# Patient Record
Sex: FEMALE | Race: AMERICAN INDIAN OR ALASKA NATIVE
[De-identification: names, ages, dates, MRNs, and addresses within clinical notes are randomized per-mention and may not be internally consistent; named-entity substitution may affect disease eponyms.]

---

## 2019-06-19 ENCOUNTER — HOSPITAL ENCOUNTER (EMERGENCY)
Dept: HOSPITAL 5 - ED | Age: 34
Discharge: HOME | End: 2019-06-19
Payer: SELF-PAY

## 2019-06-19 VITALS — SYSTOLIC BLOOD PRESSURE: 123 MMHG | DIASTOLIC BLOOD PRESSURE: 78 MMHG
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
## 2019-06-19 DIAGNOSIS — S63.502A: Primary | ICD-10-CM
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
## 2019-06-19 NOTE — XRAY REPORT
PROCEDURE: XR WRIST 3+V LT 

 

TECHNIQUE:  3 views of the left wrist were submitted. 

 

HISTORY: left wrist pain/injury 

 

COMPARISONS: None  

 

FINDINGS: 

 

There is no evidence of acute fracture or soft tissue injury. There is no evidence of arthritic meza
es. 

 

IMPRESSION:  

 

No acute findings. If localized wrist pain persists, repeat imaging is recommended in 7-10 days to ev
aluate for occult fracture.. 

 

This document is electronically signed by Salazar Yu MD., June 19 2019 03:07:27 AM ET

## 2019-06-19 NOTE — EMERGENCY DEPARTMENT REPORT
ED Back Pain/Injury HPI





- General


Chief Complaint: Extremity Injury, Upper


Stated Complaint: LT WRIST INJURY


Time Seen by Provider: 06/19/19 07:11


Source: patient


Limitations: No Limitations





- History of Present Illness


Initial Comments: 





Patient is a 34-year-old female who comes to the ER complaining of left wrist 

pain.  She fell last night on an outstretched left hand.  She is neurovascularly

intact on assessment.  Patient denies any medical problems and is on no home 

medications.





- Related Data


                                  Previous Rx's











 Medication  Instructions  Recorded  Last Taken  Type


 


traMADol [Ultram] 50 mg PO Q6HR PRN #10 tablet 06/19/19 Unknown Rx











                                    Allergies











Allergy/AdvReac Type Severity Reaction Status Date / Time


 


No Known Allergies Allergy   Unverified 06/19/19 02:33














ED Review of Systems


ROS: 


Stated complaint: LT WRIST INJURY


Other details as noted in HPI





Comment: All other systems reviewed and negative





ED Past Medical Hx





- Past Medical History


Medical history: no medical history





ED Back Pain Physical Exam





- Exam


General: 


Vital signs noted. No distress. Alert and acting appropriately.


WDWN patient in NAD


VS per RN flow sheet


Alert and oriented to person, place and time. 


S1-S2.  No S3 or S4.  No systolic or diastolic murmur.  No JVD.  No pitting 

edema.


Lungs clear to auscultation bilaterally anteriorly and posteriorly.


Abdomen soft nontender bowel sounds x4


Moves all extremities well.


Mood and affect appropriate. 





Radial and ulnar pulses +2.  Rapid capillary refill.  Mild swelling of the wrist

 is noted.  She has full range of motion but limited by pain.  Forearm and 

fingers are without injury.  There is no other associated injury.  Ulnar medial 

and radial nerves are intact.





ED Course


                                   Vital Signs











  06/19/19 06/19/19





  02:33 07:34


 


Temperature 98.1 F 


 


Pulse Rate 65 


 


Respiratory 18 18





Rate  


 


Blood Pressure 109/67 


 


O2 Sat by Pulse 98 





Oximetry  














ED Medical Decision Making





- Medical Decision Making





X-ray is negative for fracture.  Patient has been placed in an Ace bandage with 

an arm sling.  She is being discharged home with rest ice elevation and pain 

medicines.  She has been told to follow up with orthopedics next week.  Patient 

is neurovascularly intact.


                                   Vital Signs











  06/19/19 06/19/19





  02:33 07:34


 


Temperature 98.1 F 


 


Pulse Rate 65 


 


Respiratory 18 18





Rate  


 


Blood Pressure 109/67 


 


O2 Sat by Pulse 98 





Oximetry  














- Differential Diagnosis


ro fx rad/ulna


Critical care attestation.: 


If time is entered above; I have spent that time in minutes in the direct care 

of this critically ill patient, excluding procedure time.








ED Disposition


Clinical Impression: 


 Left wrist sprain, Fall from ground level





Disposition: DC-01 TO HOME OR SELFCARE


Is pt being admited?: No


Does the pt Need Aspirin: No


Condition: Stable


Instructions:  Wrist Injury (ED)


Additional Instructions: 


DIET AS TOLERATED





MEDS AS ORDERED TODAY IN ER


FOLLOW INSTRUCTIONS ON THE BOTTLE





FOLLOW UP PCP WITHIN 48 HOURS TO ENSURE YOU ARE GETTING BETTER





ACTIVITY AS TOLERATED





MOTRIN OR TYLENOL FOR PAIN OR FEVER





RETURN TO THE ER FOR WORSENING SYMPTOMS NOT RELIEVED BY YOUR MEDICATIONS.








follow up with Dr Ashley next week





ICE


REST 


ELEVATE ARM


ACE AND SLING FOR COMFORT


Referrals: 


SCOOBY ASHLYE MD [Staff Physician] - 3-5 Days


Time of Disposition: 07:47